# Patient Record
Sex: MALE | Race: WHITE
[De-identification: names, ages, dates, MRNs, and addresses within clinical notes are randomized per-mention and may not be internally consistent; named-entity substitution may affect disease eponyms.]

---

## 2019-11-03 ENCOUNTER — HOSPITAL ENCOUNTER (EMERGENCY)
Dept: HOSPITAL 7 - FB.ED | Age: 39
Discharge: HOME | End: 2019-11-03
Payer: COMMERCIAL

## 2019-11-03 DIAGNOSIS — F17.210: ICD-10-CM

## 2019-11-03 DIAGNOSIS — I10: ICD-10-CM

## 2019-11-03 DIAGNOSIS — M10.072: Primary | ICD-10-CM

## 2019-11-03 PROCEDURE — 85025 COMPLETE CBC W/AUTO DIFF WBC: CPT

## 2019-11-03 PROCEDURE — 84550 ASSAY OF BLOOD/URIC ACID: CPT

## 2019-11-03 PROCEDURE — 80048 BASIC METABOLIC PNL TOTAL CA: CPT

## 2019-11-03 PROCEDURE — 36415 COLL VENOUS BLD VENIPUNCTURE: CPT

## 2019-11-03 PROCEDURE — 99283 EMERGENCY DEPT VISIT LOW MDM: CPT

## 2019-11-03 PROCEDURE — 73610 X-RAY EXAM OF ANKLE: CPT
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
## 2019-11-04 NOTE — CR
INDICATION: No known injury or trauma, has pain.  Unable to flex or extend 
ankle.



LEFT ANKLE:  Three views of the left ankle revealed soft tissue swelling 
overlying the lateral malleolus.



A bony density adjacent to the lateral malleolus most likely represents either 
an ununited chip fracture fragment from a previous injury or an accessory 
ossification center.  An additional smaller density is noted more medially and 
inferiorly.  However, a fracture or dislocation was not identified.



Overall bone density appeared to be normal with no focal abnormal bone density 
seen.



The ankle mortise is very slightly asymmetrical with slightly less joint space 
laterally raising question of inflammatory arthritic change.  This should be 
correlated clinically.



The ankle mortise was otherwise intact.



A moderate to moderately large plantar calcaneus spur is noted.



IMPRESSION:

1.  Very slight narrowing of the lateral ankle mortise joint space, which could 
be on the basis of arthritis but should be correlated clinically.

2.  Bony densities likely represent either chip fracture fragments that did not 
unite or accessory ossification centers at the lateral malleolus;             
however there is soft tissue swelling overlying this area, which should be 
correlated clinically.

3.  Plantar calcaneal spur. 



If a focal bony abnormality is suspected clinically, additional examination 
such as MRI or nuclear bone imaging may be helpful.
JUAN MD